# Patient Record
Sex: MALE
[De-identification: names, ages, dates, MRNs, and addresses within clinical notes are randomized per-mention and may not be internally consistent; named-entity substitution may affect disease eponyms.]

---

## 2020-04-04 ENCOUNTER — HOSPITAL ENCOUNTER (EMERGENCY)
Dept: HOSPITAL 5 - ED | Age: 45
Discharge: HOME | End: 2020-04-04
Payer: SELF-PAY

## 2020-04-04 VITALS — DIASTOLIC BLOOD PRESSURE: 70 MMHG | SYSTOLIC BLOOD PRESSURE: 104 MMHG

## 2020-04-04 DIAGNOSIS — F41.9: ICD-10-CM

## 2020-04-04 DIAGNOSIS — M54.6: ICD-10-CM

## 2020-04-04 DIAGNOSIS — F14.10: ICD-10-CM
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
## 2020-04-04 DIAGNOSIS — F43.10: ICD-10-CM

## 2020-04-04 DIAGNOSIS — F17.200: ICD-10-CM
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
## 2020-04-04 DIAGNOSIS — Z98.890: ICD-10-CM

## 2020-04-04 DIAGNOSIS — F12.10: ICD-10-CM
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
## 2020-04-04 DIAGNOSIS — M62.830: Primary | ICD-10-CM

## 2020-04-04 DIAGNOSIS — F31.9: ICD-10-CM

## 2020-04-04 PROCEDURE — 99283 EMERGENCY DEPT VISIT LOW MDM: CPT
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
